# Patient Record
Sex: FEMALE | Race: WHITE | NOT HISPANIC OR LATINO | Employment: PART TIME | URBAN - METROPOLITAN AREA
[De-identification: names, ages, dates, MRNs, and addresses within clinical notes are randomized per-mention and may not be internally consistent; named-entity substitution may affect disease eponyms.]

---

## 2023-09-16 ENCOUNTER — APPOINTMENT (OUTPATIENT)
Dept: ULTRASOUND IMAGING | Facility: CLINIC | Age: 23
End: 2023-09-16
Attending: EMERGENCY MEDICINE

## 2023-09-16 ENCOUNTER — HOSPITAL ENCOUNTER (EMERGENCY)
Facility: CLINIC | Age: 23
Discharge: HOME | End: 2023-09-16
Admitting: PHYSICIAN ASSISTANT

## 2023-09-16 VITALS
HEART RATE: 102 BPM | OXYGEN SATURATION: 97 % | WEIGHT: 182 LBS | HEIGHT: 65 IN | BODY MASS INDEX: 30.32 KG/M2 | RESPIRATION RATE: 16 BRPM | TEMPERATURE: 98 F | SYSTOLIC BLOOD PRESSURE: 134 MMHG | DIASTOLIC BLOOD PRESSURE: 74 MMHG

## 2023-09-16 DIAGNOSIS — N76.0 BACTERIAL VAGINOSIS: ICD-10-CM

## 2023-09-16 DIAGNOSIS — B96.89 BACTERIAL VAGINOSIS: ICD-10-CM

## 2023-09-16 DIAGNOSIS — Z3A.16 PREGNANCY WITH 16 COMPLETED WEEKS GESTATION: ICD-10-CM

## 2023-09-16 PROBLEM — F17.210 CIGARETTE SMOKER: Status: ACTIVE | Noted: 2022-09-21

## 2023-09-16 LAB
ABO/RH(D): NORMAL
ALBUMIN SERPL BCG-MCNC: 4 G/DL (ref 3.5–5.2)
ALBUMIN UR-MCNC: 30 MG/DL
ALP SERPL-CCNC: 69 U/L (ref 35–104)
ALT SERPL W P-5'-P-CCNC: <5 U/L (ref 0–50)
ANION GAP SERPL CALCULATED.3IONS-SCNC: 10 MMOL/L (ref 7–15)
ANTIBODY SCREEN: NEGATIVE
APPEARANCE UR: ABNORMAL
APTT PPP: 31 SECONDS (ref 22–38)
AST SERPL W P-5'-P-CCNC: 13 U/L (ref 0–45)
BASOPHILS # BLD AUTO: 0.1 10E3/UL (ref 0–0.2)
BASOPHILS NFR BLD AUTO: 1 %
BILIRUB SERPL-MCNC: 0.3 MG/DL
BILIRUB UR QL STRIP: NEGATIVE
BUN SERPL-MCNC: 7.8 MG/DL (ref 6–20)
CALCIUM SERPL-MCNC: 9.2 MG/DL (ref 8.6–10)
CHLORIDE SERPL-SCNC: 103 MMOL/L (ref 98–107)
CLUE CELLS: PRESENT
COLOR UR AUTO: YELLOW
CREAT SERPL-MCNC: 0.62 MG/DL (ref 0.51–0.95)
DEPRECATED HCO3 PLAS-SCNC: 21 MMOL/L (ref 22–29)
EGFRCR SERPLBLD CKD-EPI 2021: >90 ML/MIN/1.73M2
EOSINOPHIL # BLD AUTO: 0.2 10E3/UL (ref 0–0.7)
EOSINOPHIL NFR BLD AUTO: 2 %
ERYTHROCYTE [DISTWIDTH] IN BLOOD BY AUTOMATED COUNT: 14.3 % (ref 10–15)
GLUCOSE SERPL-MCNC: 72 MG/DL (ref 70–99)
GLUCOSE UR STRIP-MCNC: NEGATIVE MG/DL
HCG INTACT+B SERPL-ACNC: ABNORMAL MIU/ML
HCG SERPL QL: POSITIVE
HCT VFR BLD AUTO: 42.1 % (ref 35–47)
HGB BLD-MCNC: 14 G/DL (ref 11.7–15.7)
HGB UR QL STRIP: NEGATIVE
IMM GRANULOCYTES # BLD: 0 10E3/UL
IMM GRANULOCYTES NFR BLD: 0 %
INR PPP: 0.94 (ref 0.85–1.15)
KETONES UR STRIP-MCNC: ABNORMAL MG/DL
LEUKOCYTE ESTERASE UR QL STRIP: ABNORMAL
LYMPHOCYTES # BLD AUTO: 2.9 10E3/UL (ref 0.8–5.3)
LYMPHOCYTES NFR BLD AUTO: 28 %
MCH RBC QN AUTO: 27.7 PG (ref 26.5–33)
MCHC RBC AUTO-ENTMCNC: 33.3 G/DL (ref 31.5–36.5)
MCV RBC AUTO: 83 FL (ref 78–100)
MONOCYTES # BLD AUTO: 0.7 10E3/UL (ref 0–1.3)
MONOCYTES NFR BLD AUTO: 6 %
MUCOUS THREADS #/AREA URNS LPF: PRESENT /LPF
NEUTROPHILS # BLD AUTO: 6.6 10E3/UL (ref 1.6–8.3)
NEUTROPHILS NFR BLD AUTO: 63 %
NITRATE UR QL: NEGATIVE
NRBC # BLD AUTO: 0 10E3/UL
NRBC BLD AUTO-RTO: 0 /100
PH UR STRIP: 7 [PH] (ref 5–7)
PLATELET # BLD AUTO: 286 10E3/UL (ref 150–450)
POTASSIUM SERPL-SCNC: 4 MMOL/L (ref 3.4–5.3)
PROT SERPL-MCNC: 7.2 G/DL (ref 6.4–8.3)
RBC # BLD AUTO: 5.05 10E6/UL (ref 3.8–5.2)
RBC URINE: 20 /HPF
SODIUM SERPL-SCNC: 134 MMOL/L (ref 136–145)
SP GR UR STRIP: 1.02 (ref 1–1.03)
SPECIMEN EXPIRATION DATE: NORMAL
SQUAMOUS EPITHELIAL: 12 /HPF
T VAGINALIS DNA SPEC QL NAA+PROBE: NOT DETECTED
TRICHOMONAS, WET PREP: ABNORMAL
UROBILINOGEN UR STRIP-MCNC: 4 MG/DL
WBC # BLD AUTO: 10.5 10E3/UL (ref 4–11)
WBC URINE: 35 /HPF
WBC'S/HIGH POWER FIELD, WET PREP: ABNORMAL
YEAST, WET PREP: ABNORMAL

## 2023-09-16 PROCEDURE — 81001 URINALYSIS AUTO W/SCOPE: CPT | Performed by: EMERGENCY MEDICINE

## 2023-09-16 PROCEDURE — 76805 OB US >/= 14 WKS SNGL FETUS: CPT

## 2023-09-16 PROCEDURE — 84702 CHORIONIC GONADOTROPIN TEST: CPT | Performed by: EMERGENCY MEDICINE

## 2023-09-16 PROCEDURE — 86850 RBC ANTIBODY SCREEN: CPT | Performed by: EMERGENCY MEDICINE

## 2023-09-16 PROCEDURE — 80053 COMPREHEN METABOLIC PANEL: CPT | Performed by: EMERGENCY MEDICINE

## 2023-09-16 PROCEDURE — 84703 CHORIONIC GONADOTROPIN ASSAY: CPT | Performed by: EMERGENCY MEDICINE

## 2023-09-16 PROCEDURE — 87491 CHLMYD TRACH DNA AMP PROBE: CPT | Performed by: PHYSICIAN ASSISTANT

## 2023-09-16 PROCEDURE — 87591 N.GONORRHOEAE DNA AMP PROB: CPT | Performed by: PHYSICIAN ASSISTANT

## 2023-09-16 PROCEDURE — 87661 TRICHOMONAS VAGINALIS AMPLIF: CPT | Performed by: PHYSICIAN ASSISTANT

## 2023-09-16 PROCEDURE — 85610 PROTHROMBIN TIME: CPT | Performed by: EMERGENCY MEDICINE

## 2023-09-16 PROCEDURE — 87210 SMEAR WET MOUNT SALINE/INK: CPT | Performed by: PHYSICIAN ASSISTANT

## 2023-09-16 PROCEDURE — 99284 EMERGENCY DEPT VISIT MOD MDM: CPT | Mod: 25

## 2023-09-16 PROCEDURE — 36415 COLL VENOUS BLD VENIPUNCTURE: CPT | Performed by: EMERGENCY MEDICINE

## 2023-09-16 PROCEDURE — 85730 THROMBOPLASTIN TIME PARTIAL: CPT | Performed by: EMERGENCY MEDICINE

## 2023-09-16 PROCEDURE — 85025 COMPLETE CBC W/AUTO DIFF WBC: CPT | Performed by: EMERGENCY MEDICINE

## 2023-09-16 PROCEDURE — 86901 BLOOD TYPING SEROLOGIC RH(D): CPT | Performed by: EMERGENCY MEDICINE

## 2023-09-16 PROCEDURE — 87086 URINE CULTURE/COLONY COUNT: CPT | Performed by: EMERGENCY MEDICINE

## 2023-09-16 RX ORDER — METRONIDAZOLE 500 MG/1
500 TABLET ORAL 2 TIMES DAILY
Qty: 14 TABLET | Refills: 0 | Status: SHIPPED | OUTPATIENT
Start: 2023-09-16 | End: 2023-09-23

## 2023-09-16 NOTE — DISCHARGE INSTRUCTIONS
Seen here in the emergency department for evaluation of vaginal discharge, lower abdominal cramping.  Your ultrasound here shows regular pregnancy at 16 weeks.  Your testing does show bacterial vaginosis, however no other evidence of trichomonas here.  There is no evidence of trichomonas on the testing here.  We will call you if gonorrhea committee comes back positive.  I will put you on some antibiotics for the bacterial vaginosis for the next 7 days.  Follow-up with your primary care doctor, I have included the number for [facility], I recommend that you set up outpatient follow-up with them to establish prenatal care.

## 2023-09-16 NOTE — ED PROVIDER NOTES
EMERGENCY DEPARTMENT ENCOUNTER      NAME: Ngoc Sanabria  AGE: 22 year old female  YOB: 2000  MRN: [id number]  EVALUATION DATE & TIME: No admission date for patient encounter.    PCP: System, Provider Not In    ED PROVIDER: Aditya Short PA-C      Chief Complaint   Patient presents with    Vaginal Bleeding - Pregnant         FINAL IMPRESSION:  1. Bacterial vaginosis    2. Pregnancy with 16 completed weeks gestation          ED COURSE & MEDICAL DECISION MAKING:    Pertinent Labs & Imaging studies reviewed. (See chart for details)  3:49 PM I met the patient and performed my initial interview and exam.   1745 PM Patient seen and updated on imaging, labs, and urinalysis. Discussed plan for discharge.     22 year old female presents to the Emergency Department for evaluation of vaginal bleeding, cramping, early pregnancy, possible abnormal vaginal discharge.     ED Course as of 09/16/23 2110   Sat Sep 16, 2023   1600 Patient is a 22-year-old female, presents emergency department, approximately 16 weeks pregnant.  She is having some abdominal cramping.  Reportedly had some bleeding a couple of weeks ago, denies passing any acute clots.  She has not received any prenatal care for this.  Did have positive pregnancy test.  Has not had any ultrasounds.  Has not seen a OB/GYN for this.  She additionally is unsure whether or not she has any possible sexually transmitted infections.  Reportedly has had some abnormal discharge as well.  Some diffuse lower abdominal pain, however no ongoing bleeding.  Denies any dysuria or hematuria.  Differential at this point is exceedingly broad given possible vaginal discharge, bleeding in early pregnancy, possible UTI, nephrolithiasis, acute cystitis, vaginal infection.  Will obtain vaginal swabs, patient will self collect both gonorrhea, chlamydia, trichomonas.  Additionally will have urinalysis done, and OB ultrasound to ensure that there is no acute abnormalities  within the pregnancy.  She is agreeable with this plan.  Plan for basic laboratory studies, swabs, and UA, as well as ultrasound.  Patient is agreeable with this plan.   1717 US OB > 14 Weeks  Presented here shows single intrauterine gestation, composite age of approximately 16 weeks 3 days.  No placenta previa, normal cervical length.   1734 Patient is Rh+.   1742 Seen and updated on results here, updated on ultrasound results.  Pending urine, as well as CMP here.  Wet prep shows some clue cells, as well as some white blood cells.   1747 Patient seen and updated on results here.  Urinalysis shows significant amount of leukocyte esterase, some white blood cells, no evidence of any bacteria, however significant squamous cells, likely contaminant.  Pending urine culture.  Wet prep here shows clue cells, as well as white blood cells, consistent with bacterial vaginosis.       Medical Decision Making    History:  Supplemental history from: Documented in chart, if applicable  External Record(s) reviewed: Documented in chart, if applicable.    Work Up:  Chart documentation includes differential considered and any EKGs or imaging independently interpreted by provider, where specified.  In additional to work up documented, I considered the following work up: Documented in chart, if applicable.    External consultation:  Discussion of management with another provider: Documented in chart, if applicable    Complicating factors:  Care impacted by chronic illness: N/A  Care affected by social determinants of health: Access to Medical Care    Disposition considerations:      At the conclusion of the encounter I discussed the results of all of the tests and the disposition. The questions were answered. The patient or family acknowledged understanding and was agreeable with the care plan.       0 minutes of critical care time     MEDICATIONS GIVEN IN THE EMERGENCY:  Medications - No data to display      NEW PRESCRIPTIONS STARTED  "AT TODAY'S ER VISIT  Discharge Medication List as of 9/16/2023  6:21 PM        START taking these medications    Details   metroNIDAZOLE (FLAGYL) 500 MG tablet Take 1 tablet (500 mg) by mouth 2 times daily for 7 days, Disp-14 tablet, R-0, E-PrescribeEat yogurt or cottage cheese daily to prevent diarrhea that can be caused by taking this medication.                =================================================================    HPI    Patient information was obtained from: Patient     Use of : N/A         Ngoc Sanabria is a 22 year old female with significant past medical history who presents to this ED for evaluation of vaginal bleeding, cramping, approximately 16 weeks pregnant.  The patient also reports some vaginal discharge as well.  She notes that she had vaginal bleeding, however no passing of clots.  She has not received any prenatal care.  She does have intermittent cramping, however no ongoing bleeding.  Denies any dysuria hematuria.  She reports that she is with the same partner, however she is concerned with possible sexually transmitted infections, wondering if she has trichomonas, has had this previously, and symptoms are fairly similar.  She denies any nausea or vomiting.  No fevers at home.  Has had positive pregnancy test, however no other prenatal care at this point.      PAST MEDICAL HISTORY:  No past medical history on file.    PAST SURGICAL HISTORY:  No past surgical history on file.        CURRENT MEDICATIONS:    metroNIDAZOLE (FLAGYL) 500 MG tablet         ALLERGIES:  No Known Allergies    FAMILY HISTORY:  No family history on file.    SOCIAL HISTORY:   Social History     Socioeconomic History    Marital status: Single       VITALS:  /74   Pulse 102   Temp 98  F (36.7  C) (Temporal)   Resp 16   Ht 1.651 m (5' 5\")   Wt 82.6 kg (182 lb)   SpO2 97%   BMI 30.29 kg/m      PHYSICAL EXAM    Physical Exam  Vitals and nursing note reviewed.   Constitutional:       " General: She is not in acute distress.     Appearance: Normal appearance. She is normal weight. She is not toxic-appearing or diaphoretic.   HENT:      Right Ear: External ear normal.      Left Ear: External ear normal.   Eyes:      Conjunctiva/sclera: Conjunctivae normal.   Cardiovascular:      Rate and Rhythm: Normal rate and regular rhythm.      Pulses: Normal pulses.   Pulmonary:      Effort: Pulmonary effort is normal. No respiratory distress.      Breath sounds: No stridor. No wheezing or rales.   Abdominal:      General: Abdomen is flat.      Palpations: Abdomen is soft.      Tenderness: There is no abdominal tenderness. There is no right CVA tenderness, left CVA tenderness, guarding or rebound.   Musculoskeletal:         General: No tenderness.      Right lower leg: No edema.      Left lower leg: No edema.   Skin:     Findings: No erythema or rash.   Neurological:      General: No focal deficit present.      Mental Status: She is alert. Mental status is at baseline.           LAB:  All pertinent labs reviewed and interpreted.  Labs Ordered and Resulted from Time of ED Arrival to Time of ED Departure   COMPREHENSIVE METABOLIC PANEL - Abnormal       Result Value    Sodium 134 (*)     Potassium 4.0      Chloride 103      Carbon Dioxide (CO2) 21 (*)     Anion Gap 10      Urea Nitrogen 7.8      Creatinine 0.62      Calcium 9.2      Glucose 72      Alkaline Phosphatase 69      AST 13      ALT <5      Protein Total 7.2      Albumin 4.0      Bilirubin Total 0.3      GFR Estimate >90     ROUTINE UA WITH MICROSCOPIC REFLEX TO CULTURE - Abnormal    Color Urine Yellow      Appearance Urine Turbid (*)     Glucose Urine Negative      Bilirubin Urine Negative      Ketones Urine Trace (*)     Specific Gravity Urine 1.020      Blood Urine Negative      pH Urine 7.0      Protein Albumin Urine 30 (*)     Urobilinogen Urine 4.0 (*)     Nitrite Urine Negative      Leukocyte Esterase Urine 500 Niya/uL (*)     Mucus Urine Present  (*)     RBC Urine 20 (*)     WBC Urine 35 (*)     Squamous Epithelials Urine 12 (*)    HCG QUALITATIVE PREGNANCY - Abnormal    hCG Serum Qualitative Positive (*)    HCG QUANTITATIVE PREGNANCY - Abnormal    hCG Quantitative 39,263 (*)    WET PREPARATION - Abnormal    Trichomonas Absent      Yeast Absent      Clue Cells Present (*)     WBCs/high power field 2+ (*)    INR - Normal    INR 0.94     PARTIAL THROMBOPLASTIN TIME - Normal    aPTT 31     CBC WITH PLATELETS AND DIFFERENTIAL    WBC Count 10.5      RBC Count 5.05      Hemoglobin 14.0      Hematocrit 42.1      MCV 83      MCH 27.7      MCHC 33.3      RDW 14.3      Platelet Count 286      % Neutrophils 63      % Lymphocytes 28      % Monocytes 6      % Eosinophils 2      % Basophils 1      % Immature Granulocytes 0      NRBCs per 100 WBC 0      Absolute Neutrophils 6.6      Absolute Lymphocytes 2.9      Absolute Monocytes 0.7      Absolute Eosinophils 0.2      Absolute Basophils 0.1      Absolute Immature Granulocytes 0.0      Absolute NRBCs 0.0     TYPE AND SCREEN, ADULT    ABO/RH(D) O POS      Antibody Screen Negative      SPECIMEN EXPIRATION DATE [id number]     CHLAMYDIA TRACHOMATIS/NEISSERIA GONORRHOEAE BY PCR   URINE CULTURE   ABO/RH TYPE AND SCREEN       RADIOLOGY:  Reviewed all pertinent imaging. Please see official radiology report.  US OB > 14 Weeks   Final Result   IMPRESSION:     1.  Single living intrauterine gestation.   2.  Based on this ultrasound, composite age of 16 weeks 3 days with EDC 02/20/2024.   3.  No placenta previa. Normal cervical length.           PROCEDURES:   None.     Aditya Short PA-C  Emergency Medicine  [facility] EMERGENCY ROOM  [address] [facility] [id number] [phone]  Dept: [phone]       Aditya Short PA-C  09/16/23 9355

## 2023-09-17 LAB
C TRACH DNA SPEC QL PROBE+SIG AMP: NEGATIVE
N GONORRHOEA DNA SPEC QL NAA+PROBE: NEGATIVE

## 2023-09-18 LAB — BACTERIA UR CULT: NORMAL

## 2023-10-10 ENCOUNTER — TRANSFERRED RECORDS (OUTPATIENT)
Dept: HEALTH INFORMATION MANAGEMENT | Facility: CLINIC | Age: 23
End: 2023-10-10
Payer: MEDICAID

## 2023-10-10 ENCOUNTER — TELEPHONE (OUTPATIENT)
Dept: OBGYN | Facility: CLINIC | Age: 23
End: 2023-10-10
Payer: MEDICAID

## 2023-10-10 DIAGNOSIS — Z33.2 TERMINATION OF PREGNANCY (FETUS): ICD-10-CM

## 2023-10-10 DIAGNOSIS — Z33.2 LEGAL TERMINATION OF PREGNANCY: Primary | ICD-10-CM

## 2023-10-10 DIAGNOSIS — Z91.89 AT HIGH RISK FOR PAIN FROM PROCEDURE: Primary | ICD-10-CM

## 2023-10-10 RX ORDER — ACETAMINOPHEN 325 MG/1
975 TABLET ORAL ONCE
Status: CANCELLED | OUTPATIENT
Start: 2023-10-10 | End: 2023-10-10

## 2023-10-10 RX ORDER — METRONIDAZOLE 500 MG/100ML
500 INJECTION, SOLUTION INTRAVENOUS EVERY 12 HOURS
Status: CANCELLED | OUTPATIENT
Start: 2023-10-10

## 2023-10-10 NOTE — TELEPHONE ENCOUNTER
Referral received from [facility] for D[facility].  She is being referred to [facility] because she needs LAMs in OR  Gestational age 20+2  RAJENDRA 02/25/24  Medical records have been received - Yes, from [facility]  An email has been sent to Kelly/financial counselor to verify insurance - yes  If [place], the Medical Necessity form is complete and has been priority scanned to the patient's chart.

## 2023-10-10 NOTE — LETTER
Before your procedure on Monday, October 23rd, make sure to  2 prescriptions: Hibiclens and Cytotec.      Hibiclens is a antibacterial soap we want you to use as body wash twice before each procedure. This means you need to shower with the soap the night of the 22nd, the morning of the 23rd, the night of the 23rd and the morning of the 24th. You can wash your hair during any of these showers, just make sure you use the antibacterial soap after you rinse your hair out.     The second medication is called Cytotec. This will be used the morning before your second procedure on the 24th.     Day of Surgery for Cervical Dilator Placement: October 23rd    Your surgery is scheduled at [facility].  [facility].  [address] [place] [address]  [id number].  If parking is needed please park in the Green Ramp.  If you are unsure how to get to the hospital - search [employer] for [facility] [employer].    Just stop at the  and security will tell you where you need to go for your surgery.    Your surgery is scheduled at 1pm.  Arrive at the hospital 2 hours before your surgery at 11am.  Please do not eat or drink after  3am (8 hours prior to ARRIVAL time).  You may have sips of clear liquids (water, black coffee, Gatorade) up to 9am (2 hours prior to ARRIVAL time).   If you have prescription medications that you take everyday, you can take those with a sip of water.    A responsible adult (18 years or older) will need to drive you home after surgery and stay with you for 24 hours.    You will be given a phone number to call if you have any concerns on the night between dilator placement and surgery.       Day of Surgery: October 24th    Your surgery is scheduled at [facility].  [facility].  [address] [place] [address]  [id number].  If parking is needed please park in the Green Ramp.  If you are unsure how to get to the hospital -  search [employer] for [employer].    Just stop at the  and security will tell you where you need to go for your surgery.    Your surgery is scheduled at 1:55pm.  Arrive at the hospital 2 hours before your surgery at 11:55am.  Please do not eat or drink after 3:55am (8 hours prior to ARRIVAL time).  You may have sips of clear liquids (water, black coffee, Gatorade) up to 9:55am (2 hours prior to ARRIVAL time).   If you have prescription medications that you take everyday, you can take those with a sip of water.    Cytotec is the last medication you picked up from the pharmacy.  This medication should be placed between your cheek and gum in your lower jaw, 2 hours before surgery.  You can place it in your cheeks as you arrive to the hospital.    A responsible adult (18 years or older) will need to drive you home after surgery and stay with you for 24 hours.      Follow up Telephone call with Nurse: October 27th at 9:30am    An RN from the [facility] care team will call you approximately 3-5 days after your procedure to check on you. During this appointment, we will ask you about your physical health (such as bleeding, cramping, pregnancy symptoms, etc.) along with how you are doing mentally and emotionally.

## 2023-10-10 NOTE — LETTER
Before your procedure on Monday, October 23rd, make sure to  2 prescriptions: Hibiclens and Cytotec.       Hibiclens is a antibacterial soap we want you to use as body wash twice before each procedure. This means you need to shower with the soap the night of the 22nd, the morning of the 23rd, the night of the 23rd and the morning of the 24th. You can wash your hair during any of these showers, just make sure you use the antibacterial soap after you rinse your hair out.      The second medication is called Cytotec. This will be used the morning before your second procedure on the 24th.      Day of Surgery for Cervical Dilator Placement: October 23rd     Your surgery is scheduled at [facility].  [facility].  [address] [place] [address]  [id number].  If parking is needed please park in the Green Ramp.  If you are unsure how to get to the hospital - search [employer] for [facility] [employer].    Just stop at the  and security will tell you where you need to go for your surgery.     Your surgery is scheduled at 1pm.  Arrive at the hospital 2 hours before your surgery at 11am.  Please do not eat or drink after  3am (8 hours prior to ARRIVAL time).  You may have sips of clear liquids (water, black coffee, Gatorade) up to 9am (2 hours prior to ARRIVAL time).   If you have prescription medications that you take everyday, you can take those with a sip of water.     A responsible adult (18 years or older) will need to drive you home after surgery and stay with you for 24 hours.     You will be given a phone number to call if you have any concerns on the night between dilator placement and surgery.         Day of Surgery: October 24th     Your surgery is scheduled at [facility].  [facility].  [address] [place] [address]  [phone].  If parking is needed please park in the Green Ramp.  If you are unsure how to get to the  hospital - search google maps for [employer].    Just stop at the  and security will tell you where you need to go for your surgery.     Your surgery is scheduled at 1:00 pm.  Arrive at the hospital 2 hours before your surgery at 11:00 am.  Please do not eat or drink after 3:55am (8 hours prior to ARRIVAL time).  You may have sips of clear liquids (water, black coffee, Gatorade) up to 9:00 am (2 hours prior to ARRIVAL time).   If you have prescription medications that you take everyday, you can take those with a sip of water.     Cytotec is the last medication you picked up from the pharmacy.  This medication should be placed between your cheek and gum in your lower jaw, 2 hours before surgery.  You can place it in your cheeks as you arrive to the hospital.     A responsible adult (18 years or older) will need to drive you home after surgery and stay with you for 24 hours.        Follow up Telephone call with Nurse: October 27th at 9:30am     An RN from the [facility] care team will call you approximately 3-5 days after your procedure to check on you. During this appointment, we will ask you about your physical health (such as bleeding, cramping, pregnancy symptoms, etc.) along with how you are doing mentally and emotionally.

## 2023-10-10 NOTE — PROGRESS NOTES
Referral from [facility]. Ngoc Sanabria is a  at 20w3d with RAJENDRA of 23 by 20w2d US referred to [facility] for termination due to inability to tolerate an examination.    1 prior vaginal delivery  Blood type: Rh POS  Mifepristone: yes if > 19 wks DOS  Misoprostol: yes 400mcg 2h prior to procedure if > 16 weeks DOS  Laminaria: yes if > 16 wks DOS  Chromosome testing: no  Remains: unknown  Desires memento box/footprints: unknown    Hgb 13.4  Anterior placenta  BMI 30    Selena Britt MD, MSCI, FACOG    [facility]  October 10, 2023

## 2023-10-11 NOTE — TELEPHONE ENCOUNTER
"Received the following response regarding insurance information:    \"Patient has [place] Medicaid for coverage.  Please have Medical Necessity Statement signed before procedure.\".   "

## 2023-10-11 NOTE — TELEPHONE ENCOUNTER
Called patient with update on insurance coverage. Informed patient that we will work on getting her procedure dates this afternoon and we will call with an update when we have one.

## 2023-10-12 RX ORDER — MISOPROSTOL 200 UG/1
TABLET ORAL
Qty: 2 TABLET | Refills: 0 | Status: ON HOLD | OUTPATIENT
Start: 2023-10-12 | End: 2023-10-24

## 2023-10-12 RX ORDER — METRONIDAZOLE 500 MG/1
TABLET ORAL
Qty: 1 TABLET | Refills: 0 | Status: CANCELLED | OUTPATIENT
Start: 2023-10-12

## 2023-10-12 NOTE — TELEPHONE ENCOUNTER
Called patient and went over surgery dates, times and instructions. Medications sent per protocol. Surgery letter sent from [facility] e-mail to patient's preferred e-mail. Patient would like hospital based disposition of remains and declines mementos at the moment. She is undecided about a  referral - e-mail to social work team sent.

## 2023-10-16 NOTE — TELEPHONE ENCOUNTER
Called patient to check in. Patient plans to  prescriptions this week. Encouraged patient to call with any questions or concerns.

## 2023-10-17 NOTE — TELEPHONE ENCOUNTER
Received report from ROSALIO Rodriguez. Created letter to reflect new surgery time and e-mailed to patient. Called patient to discuss, patient verbalized understanding. Answered all questions at this time.

## 2023-10-20 NOTE — TELEPHONE ENCOUNTER
Called patient to check in ahead of procedures next week, patient has no questions at this time and verbalized understanding of procedure time, location and instructions. Encouraged call back with any questions/concerns.

## 2023-10-22 ENCOUNTER — TELEPHONE (OUTPATIENT)
Dept: OBGYN | Facility: CLINIC | Age: 23
End: 2023-10-22
Payer: MEDICAID

## 2023-10-22 NOTE — TELEPHONE ENCOUNTER
Ngoc called as she was not able to  Hibiclens at pharmacy. She was able to get misoprostol. Discussed that it is ok if she does not wash with hibiclens before tomorrow.     Sayra Centeno MD

## 2023-10-23 ENCOUNTER — ANESTHESIA (OUTPATIENT)
Dept: SURGERY | Facility: CLINIC | Age: 23
End: 2023-10-23
Payer: MEDICAID

## 2023-10-23 ENCOUNTER — ANESTHESIA EVENT (OUTPATIENT)
Dept: SURGERY | Facility: CLINIC | Age: 23
End: 2023-10-23
Payer: MEDICAID

## 2023-10-23 ENCOUNTER — HOSPITAL ENCOUNTER (OUTPATIENT)
Facility: CLINIC | Age: 23
Discharge: HOME | End: 2023-10-23
Attending: OBSTETRICS & GYNECOLOGY | Admitting: OBSTETRICS & GYNECOLOGY
Payer: MEDICAID

## 2023-10-23 VITALS
BODY MASS INDEX: 30.67 KG/M2 | RESPIRATION RATE: 16 BRPM | WEIGHT: 184.08 LBS | SYSTOLIC BLOOD PRESSURE: 129 MMHG | DIASTOLIC BLOOD PRESSURE: 75 MMHG | HEIGHT: 65 IN | HEART RATE: 68 BPM | TEMPERATURE: 97.8 F | OXYGEN SATURATION: 98 %

## 2023-10-23 DIAGNOSIS — Z91.89 AT HIGH RISK FOR PAIN FROM PROCEDURE: ICD-10-CM

## 2023-10-23 DIAGNOSIS — Z33.2 TERMINATION OF PREGNANCY (FETUS): ICD-10-CM

## 2023-10-23 LAB
ABO/RH(D): NORMAL
ANTIBODY SCREEN: NEGATIVE
HGB BLD-MCNC: 12.8 G/DL (ref 11.7–15.7)
SPECIMEN EXPIRATION DATE: NORMAL

## 2023-10-23 PROCEDURE — 36415 COLL VENOUS BLD VENIPUNCTURE: CPT | Performed by: OBSTETRICS & GYNECOLOGY

## 2023-10-23 PROCEDURE — 250N000011 HC RX IP 250 OP 636: Mod: JZ | Performed by: OBSTETRICS & GYNECOLOGY

## 2023-10-23 PROCEDURE — 258N000003 HC RX IP 258 OP 636

## 2023-10-23 PROCEDURE — 258N000003 HC RX IP 258 OP 636: Performed by: NURSE ANESTHETIST, CERTIFIED REGISTERED

## 2023-10-23 PROCEDURE — 85018 HEMOGLOBIN: CPT | Performed by: OBSTETRICS & GYNECOLOGY

## 2023-10-23 PROCEDURE — 250N000009 HC RX 250: Performed by: OBSTETRICS & GYNECOLOGY

## 2023-10-23 PROCEDURE — 710N000009 HC RECOVERY PHASE 1, LEVEL 1, PER MIN: Performed by: OBSTETRICS & GYNECOLOGY

## 2023-10-23 PROCEDURE — 370N000017 HC ANESTHESIA TECHNICAL FEE, PER MIN: Performed by: OBSTETRICS & GYNECOLOGY

## 2023-10-23 PROCEDURE — 999N000141 HC STATISTIC PRE-PROCEDURE NURSING ASSESSMENT: Performed by: OBSTETRICS & GYNECOLOGY

## 2023-10-23 PROCEDURE — 59200 INSERT CERVICAL DILATOR: CPT | Performed by: OBSTETRICS & GYNECOLOGY

## 2023-10-23 PROCEDURE — 250N000011 HC RX IP 250 OP 636: Mod: JZ | Performed by: NURSE ANESTHETIST, CERTIFIED REGISTERED

## 2023-10-23 PROCEDURE — 250N000013 HC RX MED GY IP 250 OP 250 PS 637: Performed by: OBSTETRICS & GYNECOLOGY

## 2023-10-23 PROCEDURE — 272N000001 HC OR GENERAL SUPPLY STERILE: Performed by: OBSTETRICS & GYNECOLOGY

## 2023-10-23 PROCEDURE — 360N000074 HC SURGERY LEVEL 1, PER MIN: Performed by: OBSTETRICS & GYNECOLOGY

## 2023-10-23 PROCEDURE — 710N000012 HC RECOVERY PHASE 2, PER MINUTE: Performed by: OBSTETRICS & GYNECOLOGY

## 2023-10-23 PROCEDURE — 250N000009 HC RX 250: Performed by: NURSE ANESTHETIST, CERTIFIED REGISTERED

## 2023-10-23 PROCEDURE — 86901 BLOOD TYPING SEROLOGIC RH(D): CPT | Performed by: OBSTETRICS & GYNECOLOGY

## 2023-10-23 RX ORDER — ACETAMINOPHEN 325 MG/1
975 TABLET ORAL ONCE
Status: DISCONTINUED | OUTPATIENT
Start: 2023-10-23 | End: 2023-10-23

## 2023-10-23 RX ORDER — DIPHENHYDRAMINE HCL 25 MG
25 CAPSULE ORAL EVERY 6 HOURS PRN
Status: DISCONTINUED | OUTPATIENT
Start: 2023-10-23 | End: 2023-10-23 | Stop reason: HOSPADM

## 2023-10-23 RX ORDER — SODIUM CHLORIDE, SODIUM LACTATE, POTASSIUM CHLORIDE, CALCIUM CHLORIDE 600; 310; 30; 20 MG/100ML; MG/100ML; MG/100ML; MG/100ML
INJECTION, SOLUTION INTRAVENOUS CONTINUOUS
Status: DISCONTINUED | OUTPATIENT
Start: 2023-10-23 | End: 2023-10-23 | Stop reason: HOSPADM

## 2023-10-23 RX ORDER — ACETAMINOPHEN 325 MG/1
975 TABLET ORAL ONCE
Status: DISCONTINUED | OUTPATIENT
Start: 2023-10-23 | End: 2023-10-23 | Stop reason: HOSPADM

## 2023-10-23 RX ORDER — SODIUM CHLORIDE, SODIUM LACTATE, POTASSIUM CHLORIDE, CALCIUM CHLORIDE 600; 310; 30; 20 MG/100ML; MG/100ML; MG/100ML; MG/100ML
INJECTION, SOLUTION INTRAVENOUS CONTINUOUS PRN
Status: DISCONTINUED | OUTPATIENT
Start: 2023-10-23 | End: 2023-10-23

## 2023-10-23 RX ORDER — LABETALOL HYDROCHLORIDE 5 MG/ML
10 INJECTION, SOLUTION INTRAVENOUS
Status: DISCONTINUED | OUTPATIENT
Start: 2023-10-23 | End: 2023-10-23 | Stop reason: HOSPADM

## 2023-10-23 RX ORDER — ONDANSETRON 2 MG/ML
4 INJECTION INTRAMUSCULAR; INTRAVENOUS EVERY 30 MIN PRN
Status: DISCONTINUED | OUTPATIENT
Start: 2023-10-23 | End: 2023-10-23 | Stop reason: HOSPADM

## 2023-10-23 RX ORDER — LIDOCAINE HYDROCHLORIDE 20 MG/ML
INJECTION, SOLUTION INFILTRATION; PERINEURAL PRN
Status: DISCONTINUED | OUTPATIENT
Start: 2023-10-23 | End: 2023-10-23

## 2023-10-23 RX ORDER — PROPOFOL 10 MG/ML
INJECTION, EMULSION INTRAVENOUS PRN
Status: DISCONTINUED | OUTPATIENT
Start: 2023-10-23 | End: 2023-10-23

## 2023-10-23 RX ORDER — HYDROMORPHONE HYDROCHLORIDE 1 MG/ML
0.4 INJECTION, SOLUTION INTRAMUSCULAR; INTRAVENOUS; SUBCUTANEOUS EVERY 5 MIN PRN
Status: DISCONTINUED | OUTPATIENT
Start: 2023-10-23 | End: 2023-10-23 | Stop reason: HOSPADM

## 2023-10-23 RX ORDER — ONDANSETRON 2 MG/ML
INJECTION INTRAMUSCULAR; INTRAVENOUS PRN
Status: DISCONTINUED | OUTPATIENT
Start: 2023-10-23 | End: 2023-10-23

## 2023-10-23 RX ORDER — DIMENHYDRINATE 50 MG/ML
25 INJECTION, SOLUTION INTRAMUSCULAR; INTRAVENOUS
Status: DISCONTINUED | OUTPATIENT
Start: 2023-10-23 | End: 2023-10-23 | Stop reason: HOSPADM

## 2023-10-23 RX ORDER — METRONIDAZOLE 500 MG/100ML
500 INJECTION, SOLUTION INTRAVENOUS EVERY 12 HOURS
Status: DISCONTINUED | OUTPATIENT
Start: 2023-10-23 | End: 2023-10-23 | Stop reason: HOSPADM

## 2023-10-23 RX ORDER — DIPHENHYDRAMINE HYDROCHLORIDE 50 MG/ML
25 INJECTION INTRAMUSCULAR; INTRAVENOUS EVERY 6 HOURS PRN
Status: DISCONTINUED | OUTPATIENT
Start: 2023-10-23 | End: 2023-10-23 | Stop reason: HOSPADM

## 2023-10-23 RX ORDER — FENTANYL CITRATE 50 UG/ML
50 INJECTION, SOLUTION INTRAMUSCULAR; INTRAVENOUS EVERY 5 MIN PRN
Status: DISCONTINUED | OUTPATIENT
Start: 2023-10-23 | End: 2023-10-23 | Stop reason: HOSPADM

## 2023-10-23 RX ORDER — LIDOCAINE 40 MG/G
CREAM TOPICAL
Status: DISCONTINUED | OUTPATIENT
Start: 2023-10-23 | End: 2023-10-23 | Stop reason: HOSPADM

## 2023-10-23 RX ORDER — ONDANSETRON 4 MG/1
4 TABLET, ORALLY DISINTEGRATING ORAL EVERY 30 MIN PRN
Status: DISCONTINUED | OUTPATIENT
Start: 2023-10-23 | End: 2023-10-23 | Stop reason: HOSPADM

## 2023-10-23 RX ORDER — MIFEPRISTONE 200 MG/1
200 TABLET ORAL ONCE
Status: COMPLETED | OUTPATIENT
Start: 2023-10-23 | End: 2023-10-23

## 2023-10-23 RX ORDER — KETOROLAC TROMETHAMINE 30 MG/ML
INJECTION, SOLUTION INTRAMUSCULAR; INTRAVENOUS PRN
Status: DISCONTINUED | OUTPATIENT
Start: 2023-10-23 | End: 2023-10-23

## 2023-10-23 RX ORDER — HYDROMORPHONE HYDROCHLORIDE 1 MG/ML
0.2 INJECTION, SOLUTION INTRAMUSCULAR; INTRAVENOUS; SUBCUTANEOUS EVERY 5 MIN PRN
Status: DISCONTINUED | OUTPATIENT
Start: 2023-10-23 | End: 2023-10-23 | Stop reason: HOSPADM

## 2023-10-23 RX ORDER — OXYCODONE HYDROCHLORIDE 5 MG/1
5 TABLET ORAL
Status: DISCONTINUED | OUTPATIENT
Start: 2023-10-23 | End: 2023-10-23 | Stop reason: HOSPADM

## 2023-10-23 RX ORDER — PROPOFOL 10 MG/ML
INJECTION, EMULSION INTRAVENOUS CONTINUOUS PRN
Status: DISCONTINUED | OUTPATIENT
Start: 2023-10-23 | End: 2023-10-23

## 2023-10-23 RX ORDER — OXYCODONE HYDROCHLORIDE 5 MG/1
10 TABLET ORAL
Status: DISCONTINUED | OUTPATIENT
Start: 2023-10-23 | End: 2023-10-23 | Stop reason: HOSPADM

## 2023-10-23 RX ORDER — HALOPERIDOL 5 MG/ML
1 INJECTION INTRAMUSCULAR
Status: DISCONTINUED | OUTPATIENT
Start: 2023-10-23 | End: 2023-10-23 | Stop reason: HOSPADM

## 2023-10-23 RX ORDER — FENTANYL CITRATE 50 UG/ML
25 INJECTION, SOLUTION INTRAMUSCULAR; INTRAVENOUS EVERY 5 MIN PRN
Status: DISCONTINUED | OUTPATIENT
Start: 2023-10-23 | End: 2023-10-23 | Stop reason: HOSPADM

## 2023-10-23 RX ORDER — FENTANYL CITRATE 50 UG/ML
INJECTION, SOLUTION INTRAMUSCULAR; INTRAVENOUS PRN
Status: DISCONTINUED | OUTPATIENT
Start: 2023-10-23 | End: 2023-10-23

## 2023-10-23 RX ORDER — ACETAMINOPHEN 325 MG/1
975 TABLET ORAL ONCE
Status: COMPLETED | OUTPATIENT
Start: 2023-10-23 | End: 2023-10-23

## 2023-10-23 RX ADMIN — SODIUM CHLORIDE, POTASSIUM CHLORIDE, SODIUM LACTATE AND CALCIUM CHLORIDE: 600; 310; 30; 20 INJECTION, SOLUTION INTRAVENOUS at 13:26

## 2023-10-23 RX ADMIN — LIDOCAINE HYDROCHLORIDE 80 MG: 20 INJECTION, SOLUTION INFILTRATION; PERINEURAL at 13:35

## 2023-10-23 RX ADMIN — ACETAMINOPHEN 975 MG: 325 TABLET, FILM COATED ORAL at 12:25

## 2023-10-23 RX ADMIN — FENTANYL CITRATE 25 MCG: 50 INJECTION INTRAMUSCULAR; INTRAVENOUS at 13:35

## 2023-10-23 RX ADMIN — Medication 200 MG: at 14:30

## 2023-10-23 RX ADMIN — PROPOFOL 40 MG: 10 INJECTION, EMULSION INTRAVENOUS at 13:42

## 2023-10-23 RX ADMIN — METRONIDAZOLE 500 MG: 500 INJECTION, SOLUTION INTRAVENOUS at 13:00

## 2023-10-23 RX ADMIN — ONDANSETRON 4 MG: 2 INJECTION INTRAMUSCULAR; INTRAVENOUS at 13:44

## 2023-10-23 RX ADMIN — PROPOFOL 250 MCG/KG/MIN: 10 INJECTION, EMULSION INTRAVENOUS at 13:35

## 2023-10-23 RX ADMIN — PROPOFOL 40 MG: 10 INJECTION, EMULSION INTRAVENOUS at 13:38

## 2023-10-23 RX ADMIN — PROPOFOL 100 MG: 10 INJECTION, EMULSION INTRAVENOUS at 13:35

## 2023-10-23 RX ADMIN — KETOROLAC TROMETHAMINE 30 MG: 30 INJECTION, SOLUTION INTRAMUSCULAR at 14:00

## 2023-10-23 RX ADMIN — SODIUM CHLORIDE, POTASSIUM CHLORIDE, SODIUM LACTATE AND CALCIUM CHLORIDE: 600; 310; 30; 20 INJECTION, SOLUTION INTRAVENOUS at 13:01

## 2023-10-23 RX ADMIN — PROPOFOL 20 MG: 10 INJECTION, EMULSION INTRAVENOUS at 13:46

## 2023-10-23 RX ADMIN — FENTANYL CITRATE 25 MCG: 50 INJECTION INTRAMUSCULAR; INTRAVENOUS at 13:47

## 2023-10-23 ASSESSMENT — LIFESTYLE VARIABLES: TOBACCO_USE: 1

## 2023-10-23 ASSESSMENT — ACTIVITIES OF DAILY LIVING (ADL)
ADLS_ACUITY_SCORE: 35
ADLS_ACUITY_SCORE: 35

## 2023-10-23 NOTE — OP NOTE
[facility] GYN Surgery Operative Note    Pre-operative diagnosis: At high risk for pain from procedure [Z91.89]  Termination of pregnancy (fetus) [Z33.2]   Post-operative diagnosis: Same   Procedure: EUA with laminaria placement   Surgeon: Olivia Akbar MD   Assistant(s): None   Anesthesia: Local anesthesia and MAC   Estimated blood loss: Less than 10 ml   Total IV fluids: (See anesthesia record)   Blood transfusion: No transfusion was given during surgery   Total urine output: None   Drains: None   Specimens: None   Implants: 11 laminaria (7 extra thick, 2 medium and 2 thin)    Findings: Gravid uterus, parous cervix.    Complications: None   Condition: Stable     Description of Operation: After surgical consent was reviewed and signed the patient was brought to the operating room.  She was then given a MAC anesthetic and placed in the dorsolithotomy position.  She was sterilely prepped and draped in the usual fashion.  Speculum was inserted and the cervix easily visualized.  2 cc of lidocaine were used to anesthetize the anterior lip of the cervix.  A single-tooth tenaculum was placed.  Paracervical block was completed using lidocaine as well.  Total of 11 laminaria were placed through the level of the internal os.  Tenaculum was removed and the anterior lip of the cervix.  Bleeding was controlled using silver nitrate.  All instruments were then removed from the vagina.  Patient was taken to the PACU in stable condition.  Sponge needle and lap counts were correct x2.    Olivia Akbar MD, FACOG  (she/her/hers)    Department of Ob/Gyn/Women's Health  [employer]  [facility]  [address] [place] [address]. [place] [address]  [email or web address]  p. [phone]  f. [phone]

## 2023-10-23 NOTE — BRIEF OP NOTE
[facility] GYN Surgery Brief Operative Note    Pre-operative diagnosis: At high risk for pain from procedure [Z91.89]  Termination of pregnancy (fetus) [Z33.2]   Post-operative diagnosis: Same   Procedure: EUA with laminaria placement   Surgeon: Olivia Akbar MD   Assistant(s): None   Anesthesia: Local anesthesia and MAC   Estimated blood loss: Less than 10 ml   Total IV fluids: (See anesthesia record)   Blood transfusion: No transfusion was given during surgery   Total urine output: None   Drains: None   Specimens: None   Implants: 11 laminaria (7 extra thick, 2 medium and 2 thin)    Findings: Gravid uterus, parous cervix.    Complications: None   Condition: Stable   Comments: See dictated operative report for full details    Patient signed REMS and received Mifepristone 200  mg orally today at 235PM. Exp: Nov 2025, Lot [id number]  GTIN: [id number]  [id number]      Olivia Akbar MD, FACOG  (she/her/hers)    Department of Ob/Gyn/Women's Health  [employer]  [facility]  [address] [place], [place] [address]. [place] [address]  [email or web address]  p. [phone]  f. [phone]        SYMPTOMS SINCE LAST WEEK Thursday.LOW GRADE FEVERS AND COUGH AND NAUSEA AND VOMITTING.

## 2023-10-23 NOTE — ADDENDUM NOTE
Addendum  created 10/23/23 1518 by Jenn Mcnair MD    Attestation recorded in Intraprocedure, Intraprocedure Attestations filed

## 2023-10-23 NOTE — ANESTHESIA PREPROCEDURE EVALUATION
Anesthesia Pre-Procedure Evaluation    Patient: Ngoc Sanabria   MRN: [id number] : 2000        Procedure : Procedure(s):  EXAM UNDER ANESTHESIA, PELVIS, PLACEMENT OF LAMINARIA          No past medical history on file.   No past surgical history on file.   No Known Allergies   Social History     Tobacco Use    Smoking status: Some Days     Types: Cigarettes    Smokeless tobacco: Not on file   Substance Use Topics    Alcohol use: Not on file     Comment: rarely      Wt Readings from Last 1 Encounters:   23 82.6 kg (182 lb)        Anesthesia Evaluation            ROS/MED HX  ENT/Pulmonary:  - neg pulmonary ROS   (+)                tobacco use, Current use,                      Neurologic:  - neg neurologic ROS     Cardiovascular:  - neg cardiovascular ROS     METS/Exercise Tolerance: >4 METS    Hematologic:  - neg hematologic  ROS     Musculoskeletal:  - neg musculoskeletal ROS     GI/Hepatic:  - neg GI/hepatic ROS     Renal/Genitourinary:  - neg Renal ROS     Endo:  - neg endo ROS     Psychiatric/Substance Use:  - neg psychiatric ROS     Infectious Disease:  - neg infectious disease ROS     Malignancy:  - neg malignancy ROS     Other: Comment: Intrauterine pregnancy 22 wks gestation           Physical Exam    Airway        Mallampati: I   TM distance: > 3 FB   Neck ROM: full   Mouth opening: > 3 cm    Respiratory Devices and Support         Dental       (+) Removable bridges or other hardware      Cardiovascular          Rhythm and rate: regular and normal     Pulmonary           breath sounds clear to auscultation           OUTSIDE LABS:  CBC:   Lab Results   Component Value Date    WBC 10.5 2023    HGB 14.0 2023    HCT 42.1 2023     2023     BMP:   Lab Results   Component Value Date     (L) 2023    POTASSIUM 4.0 2023    CHLORIDE 103 2023    CO2 21 (L) 2023    BUN 7.8 2023    CR 0.62 2023    GLC 72 2023     COAGS:   Lab  Results   Component Value Date    PTT 31 09/16/2023    INR 0.94 09/16/2023     POC:   Lab Results   Component Value Date    HCGS Positive (A) 09/16/2023     HEPATIC:   Lab Results   Component Value Date    ALBUMIN 4.0 09/16/2023    PROTTOTAL 7.2 09/16/2023    ALT <5 09/16/2023    AST 13 09/16/2023    ALKPHOS 69 09/16/2023    BILITOTAL 0.3 09/16/2023     OTHER:   Lab Results   Component Value Date    ALFA 9.2 09/16/2023       Anesthesia Plan    ASA Status:  2    NPO Status:  NPO Appropriate    Anesthesia Type: MAC.     - Reason for MAC: straight local not clinically adequate   Induction: Intravenous, Propofol.           Consents    Anesthesia Plan(s) and associated risks, benefits, and realistic alternatives discussed. Questions answered and patient/representative(s) expressed understanding.     - Discussed:     - Discussed with:  Patient       Use of blood products discussed: Yes.     - Discussed with: Patient.     - Consented: consented to blood products            Reason for refusal: other.     Postoperative Care    Pain management: IV analgesics, Oral pain medications, Multi-modal analgesia.   PONV prophylaxis: Ondansetron (or other 5HT-3), Background Propofol Infusion, Dexamethasone or Solumedrol     Comments:           H&P reviewed: Unable to attach H&P to encounter due to EHR limitations. H&P Update: appropriate H&P reviewed, patient examined. No interval changes since H&P (within 30 days).         Jenn Mcnair MD

## 2023-10-23 NOTE — ANESTHESIA POSTPROCEDURE EVALUATION
Patient: Ngoc Sanabria    Procedure: Procedure(s):  EXAM UNDER ANESTHESIA, PELVIS, PLACEMENT OF LAMINARIA       Anesthesia Type:  MAC    Note:  Disposition: Outpatient   Postop Pain Control: Uneventful            Sign Out: Well controlled pain   PONV: No   Neuro/Psych: Uneventful            Sign Out: Acceptable/Baseline neuro status   Airway/Respiratory: Uneventful            Sign Out: Acceptable/Baseline resp. status   CV/Hemodynamics: Uneventful            Sign Out: Acceptable CV status; No obvious hypovolemia; No obvious fluid overload   Other NRE: NONE   DID A NON-ROUTINE EVENT OCCUR? No           Last vitals:  Vitals Value Taken Time   /62 10/23/23 1415   Temp 36.6  C (97.9  F) 10/23/23 1407   Pulse 99 10/23/23 1415   Resp 12 10/23/23 1415   SpO2 94 % 10/23/23 1416   Vitals shown include unfiled device data.    Electronically Signed By: Jenn Mcnair MD  October 23, 2023  3:17 PM

## 2023-10-23 NOTE — DISCHARGE INSTRUCTIONS
Do not eat after midnight  Ok to drink clear liquids until 9 am  Arrive at hospital same location as today at 11 am    Tylenol last given at 12:25 pm  *Next ok at 6:25 pm*  Ibuprofen last given at 2pm  *Next ok at 8 pm*    Same-Day Surgery   Adult Discharge Orders & Instructions     For 24 hours after surgery:  Get plenty of rest.  A responsible adult must stay with you for at least 24 hours after you leave the hospital.   Pain medication can slow your reflexes. Do not drive or use heavy equipment.  If you have weakness or tingling, don't drive or use heavy equipment until this feeling goes away.  Mixing alcohol and pain medication can cause dizziness and slow your breathing. It can even be fatal. Do not drink alcohol while taking pain medication.  Avoid strenuous or risky activities.  Ask for help when climbing stairs.   You may feel lightheaded.  If so, sit for a few minutes before standing.  Have someone help you get up.   If you have nausea (feel sick to your stomach), drink only clear liquids such as apple juice, ginger ale, broth or 7-Up.  Rest may also help.  Be sure to drink enough fluids.  Move to a regular diet as you feel able. Take pain medications with a small amount of solid food, such as toast or crackers, to avoid nausea.   A slight fever is normal. Call the doctor if your fever is over 100 F (37.7 C) (taken under the tongue) or lasts longer than 24 hours.  You may have a dry mouth, muscle aches, trouble sleeping or a sore throat.  These symptoms should go away after 24 hours.  Do not make important or legal decisions.   Pain Management:      1. Take pain medication (if prescribed) for pain as directed by your physician.        2. WARNING: If the pain medication you have been prescribed contains Tylenol  (acetaminophen), DO NOT take additional doses of Tylenol (acetaminophen).     Call your doctor for any of the followin.  Signs of infection (fever, growing tenderness at the surgery site, severe  pain, a large amount of drainage or bleeding, foul-smelling drainage, redness, swelling).    2.  It has been over 8 to 10 hours since surgery and you are still not able to urinate (pee).    3.  Headache for over 24 hours.    4.  Numbness, tingling or weakness the day after surgery (if you had spinal anesthesia).  To contact a doctor, call Dr. Akbar, OB/GYN, [phone]  or:  '   [phone] and ask for the Resident On Call for:          OB/GYN resident on-call (answered 24 hours a day)  '   Emergency Department:  [facility]: [phone]  [facility]: [phone]               Rev. 10/2014

## 2023-10-24 ENCOUNTER — HOSPITAL ENCOUNTER (OUTPATIENT)
Facility: CLINIC | Age: 23
Discharge: HOME | End: 2023-10-24
Attending: OBSTETRICS & GYNECOLOGY | Admitting: OBSTETRICS & GYNECOLOGY
Payer: MEDICAID

## 2023-10-24 ENCOUNTER — TELEPHONE (OUTPATIENT)
Dept: OBGYN | Facility: CLINIC | Age: 23
End: 2023-10-24
Payer: MEDICAID

## 2023-10-24 ENCOUNTER — ANESTHESIA (OUTPATIENT)
Dept: SURGERY | Facility: CLINIC | Age: 23
End: 2023-10-24
Payer: MEDICAID

## 2023-10-24 VITALS
WEIGHT: 184.08 LBS | HEART RATE: 99 BPM | TEMPERATURE: 98.6 F | DIASTOLIC BLOOD PRESSURE: 71 MMHG | OXYGEN SATURATION: 95 % | BODY MASS INDEX: 30.67 KG/M2 | RESPIRATION RATE: 18 BRPM | SYSTOLIC BLOOD PRESSURE: 124 MMHG | HEIGHT: 65 IN

## 2023-10-24 DIAGNOSIS — Z33.2 TERMINATION OF PREGNANCY (FETUS): ICD-10-CM

## 2023-10-24 DIAGNOSIS — Z91.89 AT HIGH RISK FOR PAIN FROM PROCEDURE: ICD-10-CM

## 2023-10-24 PROCEDURE — 250N000013 HC RX MED GY IP 250 OP 250 PS 637: Performed by: ANESTHESIOLOGY

## 2023-10-24 PROCEDURE — 250N000013 HC RX MED GY IP 250 OP 250 PS 637: Performed by: OBSTETRICS & GYNECOLOGY

## 2023-10-24 PROCEDURE — 88305 TISSUE EXAM BY PATHOLOGIST: CPT | Mod: 26 | Performed by: PATHOLOGY

## 2023-10-24 PROCEDURE — 250N000011 HC RX IP 250 OP 636: Mod: JZ | Performed by: OBSTETRICS & GYNECOLOGY

## 2023-10-24 PROCEDURE — 258N000003 HC RX IP 258 OP 636: Performed by: OBSTETRICS & GYNECOLOGY

## 2023-10-24 PROCEDURE — 76998 US GUIDE INTRAOP: CPT | Mod: 26 | Performed by: OBSTETRICS & GYNECOLOGY

## 2023-10-24 PROCEDURE — 710N000012 HC RECOVERY PHASE 2, PER MINUTE: Performed by: OBSTETRICS & GYNECOLOGY

## 2023-10-24 PROCEDURE — 250N000009 HC RX 250: Performed by: NURSE ANESTHETIST, CERTIFIED REGISTERED

## 2023-10-24 PROCEDURE — 258N000003 HC RX IP 258 OP 636: Performed by: NURSE ANESTHETIST, CERTIFIED REGISTERED

## 2023-10-24 PROCEDURE — 360N000075 HC SURGERY LEVEL 2, PER MIN: Performed by: OBSTETRICS & GYNECOLOGY

## 2023-10-24 PROCEDURE — 999N000141 HC STATISTIC PRE-PROCEDURE NURSING ASSESSMENT: Performed by: OBSTETRICS & GYNECOLOGY

## 2023-10-24 PROCEDURE — 250N000011 HC RX IP 250 OP 636: Mod: JZ | Performed by: ANESTHESIOLOGY

## 2023-10-24 PROCEDURE — 370N000017 HC ANESTHESIA TECHNICAL FEE, PER MIN: Performed by: OBSTETRICS & GYNECOLOGY

## 2023-10-24 PROCEDURE — 88291 CYTO/MOLECULAR REPORT: CPT | Performed by: MEDICAL GENETICS

## 2023-10-24 PROCEDURE — 250N000011 HC RX IP 250 OP 636: Performed by: NURSE ANESTHETIST, CERTIFIED REGISTERED

## 2023-10-24 PROCEDURE — 88305 TISSUE EXAM BY PATHOLOGIST: CPT | Mod: TC | Performed by: OBSTETRICS & GYNECOLOGY

## 2023-10-24 PROCEDURE — 88271 CYTOGENETICS DNA PROBE: CPT | Performed by: OBSTETRICS & GYNECOLOGY

## 2023-10-24 PROCEDURE — 250N000009 HC RX 250: Performed by: OBSTETRICS & GYNECOLOGY

## 2023-10-24 PROCEDURE — 59841 INDUCED ABORTION DILAT&EVAC: CPT | Mod: GC | Performed by: OBSTETRICS & GYNECOLOGY

## 2023-10-24 PROCEDURE — 272N000001 HC OR GENERAL SUPPLY STERILE: Performed by: OBSTETRICS & GYNECOLOGY

## 2023-10-24 RX ORDER — PROPOFOL 10 MG/ML
INJECTION, EMULSION INTRAVENOUS CONTINUOUS PRN
Status: DISCONTINUED | OUTPATIENT
Start: 2023-10-24 | End: 2023-10-24

## 2023-10-24 RX ORDER — PROPOFOL 10 MG/ML
INJECTION, EMULSION INTRAVENOUS PRN
Status: DISCONTINUED | OUTPATIENT
Start: 2023-10-24 | End: 2023-10-24

## 2023-10-24 RX ORDER — FENTANYL CITRATE 50 UG/ML
25 INJECTION, SOLUTION INTRAMUSCULAR; INTRAVENOUS EVERY 5 MIN PRN
Status: CANCELLED | OUTPATIENT
Start: 2023-10-24

## 2023-10-24 RX ORDER — OXYCODONE HYDROCHLORIDE 5 MG/1
5 TABLET ORAL
Status: CANCELLED | OUTPATIENT
Start: 2023-10-24

## 2023-10-24 RX ORDER — ACETAMINOPHEN 325 MG/1
975 TABLET ORAL ONCE
Status: COMPLETED | OUTPATIENT
Start: 2023-10-24 | End: 2023-10-24

## 2023-10-24 RX ORDER — FENTANYL CITRATE 50 UG/ML
INJECTION, SOLUTION INTRAMUSCULAR; INTRAVENOUS PRN
Status: DISCONTINUED | OUTPATIENT
Start: 2023-10-24 | End: 2023-10-24

## 2023-10-24 RX ORDER — ONDANSETRON 2 MG/ML
4 INJECTION INTRAMUSCULAR; INTRAVENOUS EVERY 30 MIN PRN
Status: CANCELLED | OUTPATIENT
Start: 2023-10-24

## 2023-10-24 RX ORDER — ONDANSETRON 4 MG/1
4 TABLET, ORALLY DISINTEGRATING ORAL EVERY 30 MIN PRN
Status: CANCELLED | OUTPATIENT
Start: 2023-10-24

## 2023-10-24 RX ORDER — IBUPROFEN 800 MG/1
800 TABLET, FILM COATED ORAL EVERY 6 HOURS PRN
Qty: 30 TABLET | Refills: 0 | Status: SHIPPED | OUTPATIENT
Start: 2023-10-24

## 2023-10-24 RX ORDER — HYDROMORPHONE HYDROCHLORIDE 1 MG/ML
0.2 INJECTION, SOLUTION INTRAMUSCULAR; INTRAVENOUS; SUBCUTANEOUS EVERY 5 MIN PRN
Status: CANCELLED | OUTPATIENT
Start: 2023-10-24

## 2023-10-24 RX ORDER — ACETAMINOPHEN 325 MG/1
975 TABLET ORAL ONCE
Status: CANCELLED | OUTPATIENT
Start: 2023-10-24 | End: 2023-10-24

## 2023-10-24 RX ORDER — IBUPROFEN 200 MG
400 TABLET ORAL ONCE
Status: COMPLETED | OUTPATIENT
Start: 2023-10-24 | End: 2023-10-24

## 2023-10-24 RX ORDER — FENTANYL CITRATE 50 UG/ML
50 INJECTION, SOLUTION INTRAMUSCULAR; INTRAVENOUS EVERY 5 MIN PRN
Status: CANCELLED | OUTPATIENT
Start: 2023-10-24

## 2023-10-24 RX ORDER — MEPERIDINE HYDROCHLORIDE 25 MG/ML
12.5 INJECTION INTRAMUSCULAR; INTRAVENOUS; SUBCUTANEOUS EVERY 5 MIN PRN
Status: CANCELLED | OUTPATIENT
Start: 2023-10-24

## 2023-10-24 RX ORDER — ONDANSETRON 2 MG/ML
INJECTION INTRAMUSCULAR; INTRAVENOUS PRN
Status: DISCONTINUED | OUTPATIENT
Start: 2023-10-24 | End: 2023-10-24

## 2023-10-24 RX ORDER — FENTANYL CITRATE 50 UG/ML
50 INJECTION, SOLUTION INTRAMUSCULAR; INTRAVENOUS ONCE
Status: COMPLETED | OUTPATIENT
Start: 2023-10-24 | End: 2023-10-24

## 2023-10-24 RX ORDER — SODIUM CHLORIDE, SODIUM LACTATE, POTASSIUM CHLORIDE, CALCIUM CHLORIDE 600; 310; 30; 20 MG/100ML; MG/100ML; MG/100ML; MG/100ML
INJECTION, SOLUTION INTRAVENOUS CONTINUOUS PRN
Status: DISCONTINUED | OUTPATIENT
Start: 2023-10-24 | End: 2023-10-24

## 2023-10-24 RX ORDER — LIDOCAINE HYDROCHLORIDE 20 MG/ML
INJECTION, SOLUTION INFILTRATION; PERINEURAL PRN
Status: DISCONTINUED | OUTPATIENT
Start: 2023-10-24 | End: 2023-10-24

## 2023-10-24 RX ORDER — HYDROMORPHONE HYDROCHLORIDE 1 MG/ML
0.4 INJECTION, SOLUTION INTRAMUSCULAR; INTRAVENOUS; SUBCUTANEOUS EVERY 5 MIN PRN
Status: CANCELLED | OUTPATIENT
Start: 2023-10-24

## 2023-10-24 RX ORDER — LABETALOL HYDROCHLORIDE 5 MG/ML
10 INJECTION, SOLUTION INTRAVENOUS
Status: CANCELLED | OUTPATIENT
Start: 2023-10-24

## 2023-10-24 RX ORDER — DEXAMETHASONE SODIUM PHOSPHATE 4 MG/ML
INJECTION, SOLUTION INTRA-ARTICULAR; INTRALESIONAL; INTRAMUSCULAR; INTRAVENOUS; SOFT TISSUE PRN
Status: DISCONTINUED | OUTPATIENT
Start: 2023-10-24 | End: 2023-10-24

## 2023-10-24 RX ORDER — SODIUM CHLORIDE, SODIUM LACTATE, POTASSIUM CHLORIDE, CALCIUM CHLORIDE 600; 310; 30; 20 MG/100ML; MG/100ML; MG/100ML; MG/100ML
INJECTION, SOLUTION INTRAVENOUS CONTINUOUS
Status: CANCELLED | OUTPATIENT
Start: 2023-10-24

## 2023-10-24 RX ORDER — KETOROLAC TROMETHAMINE 30 MG/ML
INJECTION, SOLUTION INTRAMUSCULAR; INTRAVENOUS PRN
Status: DISCONTINUED | OUTPATIENT
Start: 2023-10-24 | End: 2023-10-24

## 2023-10-24 RX ORDER — ONDANSETRON 2 MG/ML
4 INJECTION INTRAMUSCULAR; INTRAVENOUS ONCE
Status: COMPLETED | OUTPATIENT
Start: 2023-10-24 | End: 2023-10-24

## 2023-10-24 RX ADMIN — SODIUM CHLORIDE, POTASSIUM CHLORIDE, SODIUM LACTATE AND CALCIUM CHLORIDE: 600; 310; 30; 20 INJECTION, SOLUTION INTRAVENOUS at 13:05

## 2023-10-24 RX ADMIN — LIDOCAINE HYDROCHLORIDE 100 MG: 20 INJECTION, SOLUTION INFILTRATION; PERINEURAL at 13:12

## 2023-10-24 RX ADMIN — IBUPROFEN 400 MG: 400 TABLET, FILM COATED ORAL at 10:47

## 2023-10-24 RX ADMIN — FENTANYL CITRATE 50 MCG: 50 INJECTION INTRAMUSCULAR; INTRAVENOUS at 13:34

## 2023-10-24 RX ADMIN — DOXYCYCLINE 200 MG: 100 INJECTION, POWDER, LYOPHILIZED, FOR SOLUTION INTRAVENOUS at 12:35

## 2023-10-24 RX ADMIN — FENTANYL CITRATE 25 MCG: 50 INJECTION INTRAMUSCULAR; INTRAVENOUS at 13:18

## 2023-10-24 RX ADMIN — PROPOFOL 50 MG: 10 INJECTION, EMULSION INTRAVENOUS at 13:15

## 2023-10-24 RX ADMIN — MIDAZOLAM 2 MG: 1 INJECTION INTRAMUSCULAR; INTRAVENOUS at 13:05

## 2023-10-24 RX ADMIN — PROPOFOL 125 MCG/KG/MIN: 10 INJECTION, EMULSION INTRAVENOUS at 13:12

## 2023-10-24 RX ADMIN — FENTANYL CITRATE 25 MCG: 50 INJECTION INTRAMUSCULAR; INTRAVENOUS at 13:12

## 2023-10-24 RX ADMIN — ONDANSETRON 4 MG: 2 INJECTION INTRAMUSCULAR; INTRAVENOUS at 13:57

## 2023-10-24 RX ADMIN — ACETAMINOPHEN 975 MG: 325 TABLET, FILM COATED ORAL at 10:27

## 2023-10-24 RX ADMIN — PROPOFOL 20 MG: 10 INJECTION, EMULSION INTRAVENOUS at 13:19

## 2023-10-24 RX ADMIN — PROPOFOL 100 MG: 10 INJECTION, EMULSION INTRAVENOUS at 13:12

## 2023-10-24 RX ADMIN — KETOROLAC TROMETHAMINE 15 MG: 30 INJECTION, SOLUTION INTRAMUSCULAR at 14:13

## 2023-10-24 RX ADMIN — ONDANSETRON 4 MG: 2 INJECTION INTRAMUSCULAR; INTRAVENOUS at 11:12

## 2023-10-24 RX ADMIN — FENTANYL CITRATE 50 MCG: 50 INJECTION INTRAMUSCULAR; INTRAVENOUS at 10:36

## 2023-10-24 RX ADMIN — DEXAMETHASONE SODIUM PHOSPHATE 8 MG: 4 INJECTION, SOLUTION INTRA-ARTICULAR; INTRALESIONAL; INTRAMUSCULAR; INTRAVENOUS; SOFT TISSUE at 13:23

## 2023-10-24 ASSESSMENT — ACTIVITIES OF DAILY LIVING (ADL)
ADLS_ACUITY_SCORE: 35

## 2023-10-24 ASSESSMENT — LIFESTYLE VARIABLES: TOBACCO_USE: 1

## 2023-10-24 NOTE — ANESTHESIA PREPROCEDURE EVALUATION
Anesthesia Pre-Procedure Evaluation    Patient: Ngoc Sanabria   MRN: [id number] : 2000        Procedure : Procedure(s):  DILATION AND EVACUATION, UTERUS          History reviewed. No pertinent past medical history.   History reviewed. No pertinent surgical history.   No Known Allergies   Social History     Tobacco Use    Smoking status: Some Days     Types: Cigarettes    Smokeless tobacco: Never   Substance Use Topics    Alcohol use: Not on file     Comment: rarely      Wt Readings from Last 1 Encounters:   10/24/23 83.5 kg (184 lb 1.4 oz)        Anesthesia Evaluation            ROS/MED HX  ENT/Pulmonary:  - neg pulmonary ROS   (+)                tobacco use, Current use,                      Neurologic:  - neg neurologic ROS     Cardiovascular:  - neg cardiovascular ROS     METS/Exercise Tolerance: >4 METS    Hematologic:  - neg hematologic  ROS     Musculoskeletal:  - neg musculoskeletal ROS     GI/Hepatic:  - neg GI/hepatic ROS     Renal/Genitourinary:  - neg Renal ROS     Endo:  - neg endo ROS     Psychiatric/Substance Use:  - neg psychiatric ROS     Infectious Disease:  - neg infectious disease ROS     Malignancy:  - neg malignancy ROS     Other: Comment: Intrauterine pregnancy 22 wks gestation           Physical Exam    Airway        Mallampati: II   TM distance: > 3 FB   Neck ROM: full   Mouth opening: > 3 cm    Respiratory Devices and Support         Dental       (+) Removable bridges or other hardware      Cardiovascular          Rhythm and rate: regular and normal     Pulmonary           breath sounds clear to auscultation           OUTSIDE LABS:  CBC:   Lab Results   Component Value Date    WBC 10.5 2023    HGB 12.8 10/23/2023    HGB 14.0 2023    HCT 42.1 2023     2023     BMP:   Lab Results   Component Value Date     (L) 2023    POTASSIUM 4.0 2023    CHLORIDE 103 2023    CO2 21 (L) 2023    BUN 7.8 2023    CR 0.62 2023     GLC 72 09/16/2023     COAGS:   Lab Results   Component Value Date    PTT 31 09/16/2023    INR 0.94 09/16/2023     POC:   Lab Results   Component Value Date    HCGS Positive (A) 09/16/2023     HEPATIC:   Lab Results   Component Value Date    ALBUMIN 4.0 09/16/2023    PROTTOTAL 7.2 09/16/2023    ALT <5 09/16/2023    AST 13 09/16/2023    ALKPHOS 69 09/16/2023    BILITOTAL 0.3 09/16/2023     OTHER:   Lab Results   Component Value Date    ALFA 9.2 09/16/2023       Anesthesia Plan    ASA Status:  2    NPO Status:  NPO Appropriate    Anesthesia Type: MAC.   Induction: Intravenous.           Consents    Anesthesia Plan(s) and associated risks, benefits, and realistic alternatives discussed. Questions answered and patient/representative(s) expressed understanding.     - Discussed:     - Discussed with:  Patient       Use of blood products discussed: Yes.     - Discussed with: Patient.     - Consented: consented to blood products            Reason for refusal: other.     Postoperative Care    Pain management: IV analgesics, Oral pain medications.   PONV prophylaxis: Ondansetron (or other 5HT-3)     Comments:                Jenn Mcnair MD

## 2023-10-24 NOTE — OP NOTE
[facility]  Operative Note    Ngoc Sanabira   [id number]  2000    Date: 10/23/2023      Pre-operative Diagnosis:   - IUP at 22w1d    Post-operative Diagnosis:   1. S/p D&E termination     Procedure: Dilation and Evacuation under ultrasound guidance    Surgeon: Dr. Selena Britt MD    Assistants:   Nehemias Oliva MD, PGY -1  Justine Álvarez MD, PGY-4    Anesthesia: MAC    EBL: 100 cc     IVF: 450 cc crystalloid     UOP: 250 ml - straight-cath during procedure    Specimen: Products of conception    Complications: None apparent    Findings:  11 laminaria removed prior to the procedure.   Uterus was approximately 22 weeks in size and midline.   Complete fetal parts noted on examination after the procedure.   Uterus empty with gritty texture circumferentially appreciated with cannula at the end of the procedure.   Bedside ultrasound confirmed thin endometrial stripe and evacuation of all fetal tissue at end of case.    Indication: Ngoc Sanabria is a 22 year old  at 22w1d with desire for pregnancy termination. Dilapan x11 were placed 10/22/23.      Procedure: The patient was taken to the operating room and following MAC anesthesia, was placed in the dorsal lithotomy position. Exam under anesthesia was performed and 11 Dilapan were removed and counted. The cervix was noted to be about 2 cm dilated. The patient was prepped and draped in usual fashion. After a time-out was performed, a sterile speculum was placed. 2 mL 1% lidocaine with vasopressin was injected in the cervix at 12 o'clock and a ring forceps was applied to the anterior lip of the cervix. 10 mL 1% lidocaine with vasopressin was injected into the cervix both at the 4 o'clock position and the 8 o'clock position for a total of 20 mL local. The cervix was dilated easily under ultrasound guidance up to 79 Fr with Wagner dilators. Membranes were ruptured and all amniotic fluid drained. The fetus was noted to be cephalic  on ultrasound and was subsequently evacuated in pieces in the standard fashion with Bierer forceps. Once complete fetal parts were noted, suction curettage was performed and the endometrial canal was noted to be empty. The fundus was vigorously massaged and noted to be firm. The ring forceps was removed, after which the speculum was removed. The patient tolerated the procedure well and was then transferred to recovery room in stable condition.     Dr. Britt was present and scrubbed for the entire case.     Nehemias Oliva MD  Obstetrics and Gynecology, PGY-1  10/24/23 2:58 PM    OBGYN Attending Addendum     I, Selena Britt, was scrubbed and present for the entire procedure. I have reviewed Dr. Oliva's operative report and edited where necessary. I agree with the documentation of findings.     Selena Britt MD, MSCI  Date of Service: 10/24/23

## 2023-10-24 NOTE — BRIEF OP NOTE
[facility]    Brief Operative Note    Pre-operative diagnosis: At high risk for pain from procedure [Z91.89]  Termination of pregnancy (fetus) [Z33.2]  Post-operative diagnosis Same as pre-operative diagnosis    Procedure: DILATION AND EVACUATION, UTERUS, N/A - Uterus    Surgeon: Surgeon(s) and Role:     * Seelna Britt MD - Primary     * Justine Álvraez MD - Resident - Assisting     * Nehemias Oliva MD - Resident - Assisting    Anesthesia: MAC   Estimated Blood Loss: 100mL  IVF:450 mL  UOp:250 mL - straight cath    Specimens:   ID Type Source Tests Collected by Time Destination   1 : products of conception 22+2, hospital disposition Products of Conception Products of Conception SURGICAL PATHOLOGY EXAM Selena Britt MD 10/24/2023 12:33 PM      Findings:     11 laminaria removed prior to the procedure.   Uterus was approximately 22 weeks in size and midline.   Complete fetal parts noted on examination after the procedure.   Uterus empty with gritty texture circumferentially appreciated with cannula at the end of the procedure.   Bedside ultrasound confirmed thin endometrial stripe and evacuation of all fetal tissue at end of case.      Complications: None apparent    Nehemias Oliva MD  Obstetrics and Gynecology, PGY-1  10/24/23 2:08 PM

## 2023-10-24 NOTE — ANESTHESIA CARE TRANSFER NOTE
Patient: Ngoc Sanabria    Procedure: Procedure(s):  DILATION AND EVACUATION, UTERUS       Diagnosis: At high risk for pain from procedure [Z91.89]  Termination of pregnancy (fetus) [Z33.2]  Diagnosis Additional Information: No value filed.    Anesthesia Type:   MAC     Note:    Oropharynx: oropharynx clear of all foreign objects and spontaneously breathing  Level of Consciousness: awake  Oxygen Supplementation: room air    Independent Airway: airway patency satisfactory and stable  Dentition: dentition unchanged  Vital Signs Stable: post-procedure vital signs reviewed and stable  Report to RN Given: handoff report given  Patient transferred to: Phase II    Handoff Report: Identifed the Patient, Identified the Reponsible Provider, Reviewed the pertinent medical history, Discussed the surgical course, Reviewed Intra-OP anesthesia mangement and issues during anesthesia, Set expectations for post-procedure period and Allowed opportunity for questions and acknowledgement of understanding      Vitals:  Vitals Value Taken Time   /63 10/24/23 1421   Temp 37.1  C (98.7  F) 10/24/23 1421   Pulse 99 10/24/23 1421   Resp 18 10/24/23 1421   SpO2 93 % 10/24/23 1421       Electronically Signed By: SRINIVAS Mariano CRNA  October 24, 2023  2:24 PM

## 2023-10-24 NOTE — ANESTHESIA POSTPROCEDURE EVALUATION
Patient: Ngoc Sanabria    Procedure: Procedure(s):  DILATION AND EVACUATION, UTERUS       Anesthesia Type:  MAC    Note:  Disposition: Outpatient   Postop Pain Control: Uneventful            Sign Out: Well controlled pain   PONV: No   Neuro/Psych: Uneventful            Sign Out: Acceptable/Baseline neuro status   Airway/Respiratory: Uneventful            Sign Out: Acceptable/Baseline resp. status   CV/Hemodynamics: Uneventful            Sign Out: Acceptable CV status; No obvious hypovolemia; No obvious fluid overload   Other NRE: NONE   DID A NON-ROUTINE EVENT OCCUR? No           Last vitals:  Vitals Value Taken Time   /71 10/24/23 1538   Temp 37  C (98.6  F) 10/24/23 1538   Pulse 102 10/24/23 1534   Resp 18 10/24/23 1538   SpO2 95 % 10/24/23 1538   Vitals shown include unfiled device data.    Electronically Signed By: Jenn Mcnair MD  October 24, 2023  5:34 PM

## 2023-10-24 NOTE — TELEPHONE ENCOUNTER
Ngoc called, and she is having a lot of cramping pain. She has extra strength Tylenol. She took two of those at 5PM. She is still having a lot of pain. Instructed her she can take another 1 g Tylenol now (over 6 hours since last dose.) She does not have ibuprofen at home, but if she can fin any that is also ok to take. I instructed her to come to the emergency department if pain is unbearable.    Ashley Burt MD,  10/24/23 1:46 AM

## 2023-10-24 NOTE — PROGRESS NOTES
"Received referral for [facility]OILA to see on date of procedure.  She reports interest in grief resources.    SW met briefly with Ngoc this morning before her surgery.  She is uncomfortable and verbalizes pain but open to taking.  She was open in sharing her tears.    Ngoc shared feelings of ambivalence about this procedure. She states,  \"That's why I waited for so long\".  She has a baby at home and feels like she isn't in a position in her life right now where she can care for another baby.    Her supports are limited.    Ngoc's cousin dropped her off today.  She has children of her own and was not able to stay with her.  She will come to pick her up this afternoon.      It became clear in our brief conversation that Ngoc's request for grief resources was related less so about the ending of this pregnancy but more for the recent significant losses she has has in her life.  Her sister with whom she was close  from complications of covid and her mother  of heart disease.   Ngoc was tearful in talking about this and acknowledges she has not fully processed their absence in her life.      ZOILA shared a brochure for [employer] [facility] [facility] [place].    ZOILA also emailed Ngoc a therapy resource that may be a better fit for her needs, per her request.  Email sent to [email or web address].      Find a Therapist in [place], [place] -- [facility].    SW provided Ngoc my direct contact information and encouraged her to contact me if she has additional needs and/or wishes to talk.    BRUCE Moseley [facility]  Clinical   Maternal Child Health  Phone:  [phone]  Pager:  [phone]       "

## 2023-10-24 NOTE — DISCHARGE INSTRUCTIONS

## 2023-10-27 ENCOUNTER — VIRTUAL VISIT (OUTPATIENT)
Dept: OBGYN | Facility: CLINIC | Age: 23
End: 2023-10-27
Attending: OBSTETRICS & GYNECOLOGY
Payer: MEDICAID

## 2023-10-27 DIAGNOSIS — Z33.2 LEGAL TERMINATION OF PREGNANCY: Primary | ICD-10-CM

## 2023-10-27 PROCEDURE — 99207 PR NO BILLABLE SERVICE THIS VISIT: CPT | Mod: 93

## 2023-10-27 NOTE — PROGRESS NOTES
Called patient to follow up after D&E.  Procedure was on 10/24 at 22w1d (gest age) for elective termination.  Referred from PP due to need for laminaria under anesthesia..    Bleeding/clots: having small amount of bleeding, no clots.  Changing pads 1-2 x per day.    Pain/cramping: no pain or cramping    Pregnancy symptoms: breasts are tender and full.  Reviewed cold compresses, avoiding nipple stimulation, ibuprofen, snug bra.  To call if symptoms of mastitis.  Reviewed May be present for up to 2-4 weeks post procedure as pregnancy hormones leave the body.     Mood: patient reports OK  PHQ-2 score: 1  MH referral?    Follow up appointment: 11/7/23 to establish care.  Happy to see CNM for annual and discussion of  birth control   Reviewed recommendation for pelvic rest for 2 weeks. Discussed need to seek emergency care if experiencing fever, chills, diarrhea, purulent discharge/lochia, or if saturating a pad front to back, side to side within an hour. Patient verbalized understanding. Encouraged patient to call back with questions or concerns.

## 2023-10-27 NOTE — LETTER
10/27/2023       RE: Ngoc Sanabria  [address]  [place] [address]     Dear Colleague,    Thank you for referring your patient, Ngoc Sanabria, to the [facility] [place] at [facility]. Please see a copy of my visit note below.    Called patient to follow up after D&E.  Procedure was on 10/24 at 22w1d (gest age) for elective termination.  Referred from PP due to need for laminaria under anesthesia..    Bleeding/clots: having small amount of bleeding, no clots.  Changing pads 1-2 x per day.    Pain/cramping: no pain or cramping    Pregnancy symptoms: breasts are tender and full.  Reviewed cold compresses, avoiding nipple stimulation, ibuprofen, snug bra.  To call if symptoms of mastitis.  Reviewed May be present for up to 2-4 weeks post procedure as pregnancy hormones leave the body.     Mood: patient reports OK  PHQ-2 score: 1  MH referral?    Follow up appointment: 11/7/23 to establish care.  Happy to see CNM for annual and discussion of  birth control   Reviewed recommendation for pelvic rest for 2 weeks. Discussed need to seek emergency care if experiencing fever, chills, diarrhea, purulent discharge/lochia, or if saturating a pad front to back, side to side within an hour. Patient verbalized understanding. Encouraged patient to call back with questions or concerns.         [facility] OBGYN NURSE EDUCATION

## 2023-10-30 LAB
PATH REPORT.COMMENTS IMP SPEC: NORMAL
PATH REPORT.FINAL DX SPEC: NORMAL
PATH REPORT.GROSS SPEC: NORMAL
PATH REPORT.MICROSCOPIC SPEC OTHER STN: NORMAL
PATH REPORT.RELEVANT HX SPEC: NORMAL
PHOTO IMAGE: NORMAL

## 2023-11-13 LAB — INTERPRETATION: NORMAL

## 2024-05-16 ENCOUNTER — TELEPHONE (OUTPATIENT)
Dept: OBGYN | Facility: CLINIC | Age: 24
End: 2024-05-16
Payer: MEDICAID

## 2024-05-16 NOTE — TELEPHONE ENCOUNTER
"Received Medical Necessity Statement form.    Noted \"Medicaid denied claim due to date not being next to provider signature\"    Placed form in Dr. Pollock's mailbox  "

## 2024-11-18 NOTE — ANESTHESIA CARE TRANSFER NOTE
Patient: Ngoc Sanabria    Procedure: Procedure(s):  EXAM UNDER ANESTHESIA, PELVIS, PLACEMENT OF LAMINARIA       Diagnosis: At high risk for pain from procedure [Z91.89]  Termination of pregnancy (fetus) [Z33.2]  Diagnosis Additional Information: No value filed.    Anesthesia Type:   MAC     Note:    Oropharynx: oropharynx clear of all foreign objects and spontaneously breathing  Level of Consciousness: drowsy  Oxygen Supplementation: nasal cannula  Level of Supplemental Oxygen (L/min / FiO2): 2  Independent Airway: airway patency satisfactory and stable  Dentition: dentition unchanged  Vital Signs Stable: post-procedure vital signs reviewed and stable  Report to RN Given: handoff report given  Patient transferred to: Phase II  Comments: To Phase II with 02, Spont RR. Monitors applied, VSS, PIV/airway patent, Report to RN all questions/concerns answered.     Handoff Report: Identifed the Patient, Identified the Reponsible Provider, Reviewed the pertinent medical history, Discussed the surgical course, Reviewed Intra-OP anesthesia mangement and issues during anesthesia, Set expectations for post-procedure period and Allowed opportunity for questions and acknowledgement of understanding      Vitals:  Vitals Value Taken Time   /62 10/23/23 1415   Temp 36.6    Pulse 99 10/23/23 1415   Resp 12    SpO2 94 % 10/23/23 1416   Vitals shown include unfiled device data.    Electronically Signed By: SRINIVAS Bonilla CRNA  October 23, 2023  2:20 PM  
show

## (undated) DEVICE — STRAP KNEE/BODY 31143004

## (undated) DEVICE — GOWN XLG DISP 9545

## (undated) DEVICE — GLOVE BIOGEL PI ULTRATOUCH G SZ 6.5 42165

## (undated) DEVICE — Device

## (undated) DEVICE — PREP DYNA-HEX 4% CHG SCRUB 4OZ BOTTLE MDS098710

## (undated) DEVICE — PAD FLOOR SURGSAFE 30X40" 83030

## (undated) DEVICE — SOL NACL 0.9% IRRIG 1000ML BOTTLE 2F7124

## (undated) DEVICE — GLOVE BIOGEL PI MICRO INDICATOR UNDERGLOVE SZ 7.0 48970

## (undated) DEVICE — LINEN TOWEL PACK X5 5464

## (undated) DEVICE — LINEN GOWN X4 5410

## (undated) DEVICE — SUCTION VACUUM CANISTER STANDARD W/LID&CAPS 003987-901

## (undated) DEVICE — TUBING VACUUM COLLECTION SET LG 1/2"X6' BKT-506

## (undated) DEVICE — TUBING SUCTION VACUUM COLLECTION 6FTX3/8" 022310

## (undated) DEVICE — SOL WATER IRRIG 1000ML BOTTLE 2F7114

## (undated) DEVICE — SPECIMEN TRAP VACUUM SUCTION SAFETOUCH 003853-902

## (undated) DEVICE — DRAPE TIBURON TOP SHEET 100X60" 29352

## (undated) DEVICE — LINEN LEG DRAPE 5457

## (undated) DEVICE — PAD CHUX UNDERPAD 30X36" P3036C

## (undated) DEVICE — DECANTER TRANSFER DEVICE 2008S

## (undated) DEVICE — SYR 01ML TBC SLIP TIP W/O NDL

## (undated) DEVICE — NDL 18GA 1.5" 305196

## (undated) DEVICE — JELLY LUBRICATING SURGILUBE 2OZ TUBE 0281-0205-02

## (undated) DEVICE — SUCTION CANNULA UTERINE 12MM CVD 022112-10

## (undated) DEVICE — SOLIDIFIER (USE FOR UP TO 1500CC) MSOLID1500

## (undated) RX ORDER — LIDOCAINE HYDROCHLORIDE 10 MG/ML
INJECTION, SOLUTION EPIDURAL; INFILTRATION; INTRACAUDAL; PERINEURAL
Status: DISPENSED
Start: 2023-10-23

## (undated) RX ORDER — PROPOFOL 10 MG/ML
INJECTION, EMULSION INTRAVENOUS
Status: DISPENSED
Start: 2023-10-23

## (undated) RX ORDER — FENTANYL CITRATE 50 UG/ML
INJECTION, SOLUTION INTRAMUSCULAR; INTRAVENOUS
Status: DISPENSED
Start: 2023-10-23

## (undated) RX ORDER — ACETAMINOPHEN 325 MG/1
TABLET ORAL
Status: DISPENSED
Start: 2023-10-23

## (undated) RX ORDER — LIDOCAINE HYDROCHLORIDE 10 MG/ML
INJECTION, SOLUTION EPIDURAL; INFILTRATION; INTRACAUDAL; PERINEURAL
Status: DISPENSED
Start: 2023-10-24

## (undated) RX ORDER — FENTANYL CITRATE 50 UG/ML
INJECTION, SOLUTION INTRAMUSCULAR; INTRAVENOUS
Status: DISPENSED
Start: 2023-10-24

## (undated) RX ORDER — ONDANSETRON 2 MG/ML
INJECTION INTRAMUSCULAR; INTRAVENOUS
Status: DISPENSED
Start: 2023-10-24

## (undated) RX ORDER — MISOPROSTOL 200 UG/1
TABLET ORAL
Status: DISPENSED
Start: 2023-10-24

## (undated) RX ORDER — METRONIDAZOLE 500 MG/100ML
INJECTION, SOLUTION INTRAVENOUS
Status: DISPENSED
Start: 2023-10-23

## (undated) RX ORDER — ONDANSETRON 2 MG/ML
INJECTION INTRAMUSCULAR; INTRAVENOUS
Status: DISPENSED
Start: 2023-10-23

## (undated) RX ORDER — VASOPRESSIN 20 U/ML
INJECTION PARENTERAL
Status: DISPENSED
Start: 2023-10-24

## (undated) RX ORDER — IBUPROFEN 400 MG/1
TABLET, FILM COATED ORAL
Status: DISPENSED
Start: 2023-10-24

## (undated) RX ORDER — ACETAMINOPHEN 325 MG/1
TABLET ORAL
Status: DISPENSED
Start: 2023-10-24